# Patient Record
Sex: FEMALE | Race: WHITE | NOT HISPANIC OR LATINO | Employment: OTHER | URBAN - METROPOLITAN AREA
[De-identification: names, ages, dates, MRNs, and addresses within clinical notes are randomized per-mention and may not be internally consistent; named-entity substitution may affect disease eponyms.]

---

## 2022-04-13 ENCOUNTER — LAB REQUISITION (OUTPATIENT)
Dept: LAB | Facility: CLINIC | Age: 72
End: 2022-04-13
Payer: MEDICARE

## 2022-04-13 DIAGNOSIS — M25.551 PAIN IN RIGHT HIP: ICD-10-CM

## 2022-04-13 LAB
GRAM STAIN RESULT: NORMAL
GRAM STAIN RESULT: NORMAL

## 2022-04-13 PROCEDURE — 87075 CULTR BACTERIA EXCEPT BLOOD: CPT | Mod: ORL | Performed by: ORTHOPAEDIC SURGERY

## 2022-04-13 PROCEDURE — 87070 CULTURE OTHR SPECIMN AEROBIC: CPT | Mod: ORL | Performed by: ORTHOPAEDIC SURGERY

## 2022-04-13 PROCEDURE — 87205 SMEAR GRAM STAIN: CPT | Mod: ORL | Performed by: ORTHOPAEDIC SURGERY

## 2022-04-18 LAB — BACTERIA SNV CULT: NO GROWTH

## 2022-04-27 LAB — BACTERIA SNV CULT: NORMAL

## 2024-05-17 ENCOUNTER — TRANSFERRED RECORDS (OUTPATIENT)
Dept: HEALTH INFORMATION MANAGEMENT | Facility: CLINIC | Age: 74
End: 2024-05-17

## 2024-06-18 RX ORDER — FLUTICASONE PROPIONATE 50 MCG
1 SPRAY, SUSPENSION (ML) NASAL DAILY PRN
COMMUNITY

## 2024-06-18 RX ORDER — MELOXICAM 7.5 MG/1
7.5 TABLET ORAL DAILY
Status: ON HOLD | COMMUNITY
End: 2024-06-26

## 2024-06-18 RX ORDER — VITAMIN B COMPLEX
1 TABLET ORAL DAILY
Status: ON HOLD | COMMUNITY
End: 2024-06-25

## 2024-06-18 RX ORDER — MULTIVITAMIN,THERAPEUTIC
1 TABLET ORAL DAILY
COMMUNITY

## 2024-06-18 RX ORDER — TRAMADOL HYDROCHLORIDE 50 MG/1
25-100 TABLET ORAL EVERY 6 HOURS PRN
Status: ON HOLD | COMMUNITY
End: 2024-06-26

## 2024-06-18 RX ORDER — CALCIUM CARBONATE 260MG(650)
1 TABLET,CHEWABLE ORAL DAILY
COMMUNITY
End: 2024-06-18

## 2024-06-18 RX ORDER — AMLODIPINE BESYLATE 2.5 MG/1
2.5 TABLET ORAL DAILY
COMMUNITY

## 2024-06-18 RX ORDER — CETIRIZINE HYDROCHLORIDE 10 MG/1
10 TABLET ORAL DAILY PRN
COMMUNITY

## 2024-06-18 RX ORDER — LORATADINE 10 MG/1
10 TABLET ORAL DAILY
COMMUNITY
End: 2024-06-18

## 2024-06-18 RX ORDER — HYDROCHLOROTHIAZIDE 25 MG/1
25 TABLET ORAL DAILY
COMMUNITY

## 2024-06-18 RX ORDER — AMOXICILLIN 500 MG/1
2000 TABLET, FILM COATED ORAL
COMMUNITY

## 2024-06-18 RX ORDER — TRIAMCINOLONE ACETONIDE 1 MG/G
CREAM TOPICAL 2 TIMES DAILY PRN
COMMUNITY
End: 2024-06-18

## 2024-06-18 NOTE — PROGRESS NOTES
Planning to discharge home on POD 1 in the morning with her granddaughter helping her.       06/18/24 0426   Discharge Planning   Patient/Family Anticipates Transition to home with family   Concerns to be Addressed all concerns addressed in this encounter   Living Arrangements   People in Home grandchild(freddie)   Type of Residence Private Residence   Is your private residence a single family home or apartment? Single family home   Number of Stairs, Within Home, Primary none  (1 exterior step)   Stair Railings, Within Home, Primary none   Once home, are you able to live on one level? Yes   Which level? Main Level   Bathroom Shower/Tub Walk-in shower   Equipment Currently Used at Home raised toilet seat;commode chair;grab bar, toilet;cane, quad;cane, straight;walker, rolling   Support System   Support Systems Family Members  (Granddaughter, Con, will stay with her after surgery)   Do you have someone available to stay with you one or two nights once you are home? Yes   Education   Patient attended total joint pre-op class/received pre-op teaching  email/phone call

## 2024-06-25 ENCOUNTER — HOSPITAL ENCOUNTER (OUTPATIENT)
Facility: CLINIC | Age: 74
Discharge: HOME | End: 2024-06-26
Attending: ORTHOPAEDIC SURGERY | Admitting: ORTHOPAEDIC SURGERY
Payer: MEDICARE

## 2024-06-25 ENCOUNTER — ANESTHESIA EVENT (OUTPATIENT)
Dept: SURGERY | Facility: CLINIC | Age: 74
End: 2024-06-25
Payer: MEDICARE

## 2024-06-25 ENCOUNTER — APPOINTMENT (OUTPATIENT)
Dept: RADIOLOGY | Facility: CLINIC | Age: 74
End: 2024-06-25
Attending: ORTHOPAEDIC SURGERY
Payer: MEDICARE

## 2024-06-25 ENCOUNTER — APPOINTMENT (OUTPATIENT)
Dept: PHYSICAL THERAPY | Facility: CLINIC | Age: 74
End: 2024-06-25
Attending: ORTHOPAEDIC SURGERY
Payer: MEDICARE

## 2024-06-25 ENCOUNTER — ANESTHESIA (OUTPATIENT)
Dept: SURGERY | Facility: CLINIC | Age: 74
End: 2024-06-25
Payer: MEDICARE

## 2024-06-25 ENCOUNTER — DOCUMENTATION ONLY (OUTPATIENT)
Dept: OTHER | Facility: CLINIC | Age: 74
End: 2024-06-25
Payer: COMMERCIAL

## 2024-06-25 DIAGNOSIS — M16.12 PRIMARY OSTEOARTHRITIS OF LEFT HIP: Primary | ICD-10-CM

## 2024-06-25 LAB
CREAT SERPL-MCNC: 0.71 MG/DL (ref 0.51–0.95)
EGFRCR SERPLBLD CKD-EPI 2021: 89 ML/MIN/1.73M2

## 2024-06-25 PROCEDURE — 250N000011 HC RX IP 250 OP 636: Mod: JZ | Performed by: ORTHOPAEDIC SURGERY

## 2024-06-25 PROCEDURE — 250N000011 HC RX IP 250 OP 636: Mod: JZ | Performed by: ANESTHESIOLOGY

## 2024-06-25 PROCEDURE — 360N000077 HC SURGERY LEVEL 4, PER MIN: Performed by: ORTHOPAEDIC SURGERY

## 2024-06-25 PROCEDURE — 250N000009 HC RX 250: Performed by: NURSE ANESTHETIST, CERTIFIED REGISTERED

## 2024-06-25 PROCEDURE — C1776 JOINT DEVICE (IMPLANTABLE): HCPCS | Performed by: ORTHOPAEDIC SURGERY

## 2024-06-25 PROCEDURE — 999N000141 HC STATISTIC PRE-PROCEDURE NURSING ASSESSMENT: Performed by: ORTHOPAEDIC SURGERY

## 2024-06-25 PROCEDURE — 99254 IP/OBS CNSLTJ NEW/EST MOD 60: CPT | Performed by: HOSPITALIST

## 2024-06-25 PROCEDURE — 97530 THERAPEUTIC ACTIVITIES: CPT | Mod: GP

## 2024-06-25 PROCEDURE — 82565 ASSAY OF CREATININE: CPT | Performed by: ORTHOPAEDIC SURGERY

## 2024-06-25 PROCEDURE — 72170 X-RAY EXAM OF PELVIS: CPT

## 2024-06-25 PROCEDURE — 272N000001 HC OR GENERAL SUPPLY STERILE: Performed by: ORTHOPAEDIC SURGERY

## 2024-06-25 PROCEDURE — 250N000011 HC RX IP 250 OP 636: Mod: JZ | Performed by: NURSE ANESTHETIST, CERTIFIED REGISTERED

## 2024-06-25 PROCEDURE — 250N000009 HC RX 250: Performed by: PHYSICIAN ASSISTANT

## 2024-06-25 PROCEDURE — 250N000011 HC RX IP 250 OP 636: Performed by: PHYSICIAN ASSISTANT

## 2024-06-25 PROCEDURE — 258N000001 HC RX 258: Performed by: ORTHOPAEDIC SURGERY

## 2024-06-25 PROCEDURE — 97161 PT EVAL LOW COMPLEX 20 MIN: CPT | Mod: GP

## 2024-06-25 PROCEDURE — 36415 COLL VENOUS BLD VENIPUNCTURE: CPT | Performed by: ORTHOPAEDIC SURGERY

## 2024-06-25 PROCEDURE — 250N000013 HC RX MED GY IP 250 OP 250 PS 637: Performed by: ORTHOPAEDIC SURGERY

## 2024-06-25 PROCEDURE — 250N000013 HC RX MED GY IP 250 OP 250 PS 637: Performed by: ANESTHESIOLOGY

## 2024-06-25 PROCEDURE — C1713 ANCHOR/SCREW BN/BN,TIS/BN: HCPCS | Performed by: ORTHOPAEDIC SURGERY

## 2024-06-25 PROCEDURE — 97110 THERAPEUTIC EXERCISES: CPT | Mod: GP

## 2024-06-25 PROCEDURE — 258N000003 HC RX IP 258 OP 636: Mod: JZ | Performed by: ANESTHESIOLOGY

## 2024-06-25 PROCEDURE — 258N000003 HC RX IP 258 OP 636: Performed by: NURSE ANESTHETIST, CERTIFIED REGISTERED

## 2024-06-25 PROCEDURE — 370N000017 HC ANESTHESIA TECHNICAL FEE, PER MIN: Performed by: ORTHOPAEDIC SURGERY

## 2024-06-25 PROCEDURE — 710N000010 HC RECOVERY PHASE 1, LEVEL 2, PER MIN: Performed by: ORTHOPAEDIC SURGERY

## 2024-06-25 PROCEDURE — 258N000003 HC RX IP 258 OP 636: Performed by: ORTHOPAEDIC SURGERY

## 2024-06-25 PROCEDURE — 250N000013 HC RX MED GY IP 250 OP 250 PS 637: Performed by: PHYSICIAN ASSISTANT

## 2024-06-25 DEVICE — IMP SCR ACET SNN SPHERICAL HEAD 6.5X30MM 71332530: Type: IMPLANTABLE DEVICE | Site: HIP | Status: FUNCTIONAL

## 2024-06-25 DEVICE — IMPLANTABLE DEVICE: Type: IMPLANTABLE DEVICE | Site: HIP | Status: FUNCTIONAL

## 2024-06-25 DEVICE — IMP SHELL SNR ACET R3 3H 56MM 71335556: Type: IMPLANTABLE DEVICE | Site: HIP | Status: FUNCTIONAL

## 2024-06-25 DEVICE — IMP SCR ACET SNN SPHERICAL HEAD 6.5X15MM 71332515: Type: IMPLANTABLE DEVICE | Site: HIP | Status: FUNCTIONAL

## 2024-06-25 DEVICE — GUIDE PIN LONG ACETABULAR: Type: IMPLANTABLE DEVICE | Site: HIP | Status: FUNCTIONAL

## 2024-06-25 DEVICE — IMP SCR ACET SNN SPHERICAL HEAD 6.5X25MM 71332525: Type: IMPLANTABLE DEVICE | Site: HIP | Status: FUNCTIONAL

## 2024-06-25 DEVICE — HEAD OX MODULAR 40MM: Type: IMPLANTABLE DEVICE | Site: HIP | Status: FUNCTIONAL

## 2024-06-25 DEVICE — IMP FEMORAL SLEEVE S&N MED MOD +4MM NECK TI 71344248: Type: IMPLANTABLE DEVICE | Site: HIP | Status: FUNCTIONAL

## 2024-06-25 RX ORDER — KETAMINE HYDROCHLORIDE 10 MG/ML
INJECTION INTRAMUSCULAR; INTRAVENOUS PRN
Status: DISCONTINUED | OUTPATIENT
Start: 2024-06-25 | End: 2024-06-25

## 2024-06-25 RX ORDER — CHOLECALCIFEROL (VITAMIN D3) 50 MCG
1 TABLET ORAL DAILY
COMMUNITY

## 2024-06-25 RX ORDER — CEFAZOLIN SODIUM/WATER 2 G/20 ML
2 SYRINGE (ML) INTRAVENOUS SEE ADMIN INSTRUCTIONS
Status: DISCONTINUED | OUTPATIENT
Start: 2024-06-25 | End: 2024-06-25 | Stop reason: HOSPADM

## 2024-06-25 RX ORDER — PROPOFOL 10 MG/ML
INJECTION, EMULSION INTRAVENOUS CONTINUOUS PRN
Status: DISCONTINUED | OUTPATIENT
Start: 2024-06-25 | End: 2024-06-25

## 2024-06-25 RX ORDER — AMOXICILLIN 250 MG
1 CAPSULE ORAL 2 TIMES DAILY
Status: DISCONTINUED | OUTPATIENT
Start: 2024-06-25 | End: 2024-06-26 | Stop reason: HOSPADM

## 2024-06-25 RX ORDER — OXYCODONE HYDROCHLORIDE 5 MG/1
5 TABLET ORAL EVERY 4 HOURS PRN
Status: DISCONTINUED | OUTPATIENT
Start: 2024-06-25 | End: 2024-06-26 | Stop reason: HOSPADM

## 2024-06-25 RX ORDER — HYDROCHLOROTHIAZIDE 25 MG/1
25 TABLET ORAL DAILY
Status: DISCONTINUED | OUTPATIENT
Start: 2024-06-26 | End: 2024-06-26 | Stop reason: HOSPADM

## 2024-06-25 RX ORDER — HYDROMORPHONE HCL IN WATER/PF 6 MG/30 ML
0.4 PATIENT CONTROLLED ANALGESIA SYRINGE INTRAVENOUS EVERY 5 MIN PRN
Status: DISCONTINUED | OUTPATIENT
Start: 2024-06-25 | End: 2024-06-25 | Stop reason: HOSPADM

## 2024-06-25 RX ORDER — ONDANSETRON 2 MG/ML
4 INJECTION INTRAMUSCULAR; INTRAVENOUS EVERY 6 HOURS PRN
Status: DISCONTINUED | OUTPATIENT
Start: 2024-06-25 | End: 2024-06-26 | Stop reason: HOSPADM

## 2024-06-25 RX ORDER — HYDROMORPHONE HCL IN WATER/PF 6 MG/30 ML
0.2 PATIENT CONTROLLED ANALGESIA SYRINGE INTRAVENOUS EVERY 5 MIN PRN
Status: DISCONTINUED | OUTPATIENT
Start: 2024-06-25 | End: 2024-06-25 | Stop reason: HOSPADM

## 2024-06-25 RX ORDER — TRANEXAMIC ACID 650 MG/1
1950 TABLET ORAL ONCE
Status: COMPLETED | OUTPATIENT
Start: 2024-06-25 | End: 2024-06-25

## 2024-06-25 RX ORDER — ASCORBIC ACID 125 MG
125 TABLET,CHEWABLE ORAL DAILY
COMMUNITY

## 2024-06-25 RX ORDER — OXYCODONE HYDROCHLORIDE 5 MG/1
10 TABLET ORAL EVERY 4 HOURS PRN
Status: DISCONTINUED | OUTPATIENT
Start: 2024-06-25 | End: 2024-06-26 | Stop reason: HOSPADM

## 2024-06-25 RX ORDER — CEFAZOLIN SODIUM/WATER 2 G/20 ML
2 SYRINGE (ML) INTRAVENOUS
Status: COMPLETED | OUTPATIENT
Start: 2024-06-25 | End: 2024-06-25

## 2024-06-25 RX ORDER — SODIUM CHLORIDE, SODIUM LACTATE, POTASSIUM CHLORIDE, CALCIUM CHLORIDE 600; 310; 30; 20 MG/100ML; MG/100ML; MG/100ML; MG/100ML
INJECTION, SOLUTION INTRAVENOUS CONTINUOUS
Status: DISCONTINUED | OUTPATIENT
Start: 2024-06-25 | End: 2024-06-25 | Stop reason: HOSPADM

## 2024-06-25 RX ORDER — NALOXONE HYDROCHLORIDE 0.4 MG/ML
0.1 INJECTION, SOLUTION INTRAMUSCULAR; INTRAVENOUS; SUBCUTANEOUS
Status: DISCONTINUED | OUTPATIENT
Start: 2024-06-25 | End: 2024-06-25 | Stop reason: HOSPADM

## 2024-06-25 RX ORDER — HYDROXYZINE HYDROCHLORIDE 10 MG/1
10 TABLET, FILM COATED ORAL EVERY 6 HOURS PRN
Status: DISCONTINUED | OUTPATIENT
Start: 2024-06-25 | End: 2024-06-26 | Stop reason: HOSPADM

## 2024-06-25 RX ORDER — AMLODIPINE BESYLATE 2.5 MG/1
2.5 TABLET ORAL DAILY
Status: DISCONTINUED | OUTPATIENT
Start: 2024-06-26 | End: 2024-06-26 | Stop reason: HOSPADM

## 2024-06-25 RX ORDER — HYDROMORPHONE HCL IN WATER/PF 6 MG/30 ML
0.4 PATIENT CONTROLLED ANALGESIA SYRINGE INTRAVENOUS
Status: DISCONTINUED | OUTPATIENT
Start: 2024-06-25 | End: 2024-06-26 | Stop reason: HOSPADM

## 2024-06-25 RX ORDER — BUPIVACAINE HYDROCHLORIDE 5 MG/ML
INJECTION, SOLUTION EPIDURAL; INTRACAUDAL
Status: COMPLETED | OUTPATIENT
Start: 2024-06-25 | End: 2024-06-25

## 2024-06-25 RX ORDER — CEFAZOLIN SODIUM 1 G/3ML
1 INJECTION, POWDER, FOR SOLUTION INTRAMUSCULAR; INTRAVENOUS EVERY 8 HOURS
Status: COMPLETED | OUTPATIENT
Start: 2024-06-25 | End: 2024-06-26

## 2024-06-25 RX ORDER — ONDANSETRON 4 MG/1
4 TABLET, ORALLY DISINTEGRATING ORAL EVERY 6 HOURS PRN
Status: DISCONTINUED | OUTPATIENT
Start: 2024-06-25 | End: 2024-06-26 | Stop reason: HOSPADM

## 2024-06-25 RX ORDER — KETOROLAC TROMETHAMINE 30 MG/ML
15 INJECTION, SOLUTION INTRAMUSCULAR; INTRAVENOUS EVERY 6 HOURS
Status: COMPLETED | OUTPATIENT
Start: 2024-06-25 | End: 2024-06-26

## 2024-06-25 RX ORDER — DEXAMETHASONE SODIUM PHOSPHATE 10 MG/ML
INJECTION, SOLUTION INTRAMUSCULAR; INTRAVENOUS PRN
Status: DISCONTINUED | OUTPATIENT
Start: 2024-06-25 | End: 2024-06-25

## 2024-06-25 RX ORDER — ACETAMINOPHEN 325 MG/1
975 TABLET ORAL EVERY 8 HOURS
Status: DISCONTINUED | OUTPATIENT
Start: 2024-06-25 | End: 2024-06-26 | Stop reason: HOSPADM

## 2024-06-25 RX ORDER — ASCORBIC ACID 125 MG
125 TABLET,CHEWABLE ORAL DAILY
Status: DISCONTINUED | OUTPATIENT
Start: 2024-06-25 | End: 2024-06-25 | Stop reason: DRUGHIGH

## 2024-06-25 RX ORDER — ONDANSETRON 4 MG/1
4 TABLET, ORALLY DISINTEGRATING ORAL EVERY 30 MIN PRN
Status: DISCONTINUED | OUTPATIENT
Start: 2024-06-25 | End: 2024-06-25 | Stop reason: HOSPADM

## 2024-06-25 RX ORDER — ACETAMINOPHEN 325 MG/1
975 TABLET ORAL ONCE
Status: COMPLETED | OUTPATIENT
Start: 2024-06-25 | End: 2024-06-25

## 2024-06-25 RX ORDER — PROCHLORPERAZINE MALEATE 5 MG
5 TABLET ORAL EVERY 6 HOURS PRN
Status: DISCONTINUED | OUTPATIENT
Start: 2024-06-25 | End: 2024-06-26 | Stop reason: HOSPADM

## 2024-06-25 RX ORDER — ASPIRIN 81 MG/1
81 TABLET ORAL 2 TIMES DAILY
Status: DISCONTINUED | OUTPATIENT
Start: 2024-06-25 | End: 2024-06-26 | Stop reason: HOSPADM

## 2024-06-25 RX ORDER — FENTANYL CITRATE 50 UG/ML
50 INJECTION, SOLUTION INTRAMUSCULAR; INTRAVENOUS EVERY 5 MIN PRN
Status: DISCONTINUED | OUTPATIENT
Start: 2024-06-25 | End: 2024-06-25 | Stop reason: HOSPADM

## 2024-06-25 RX ORDER — GLYCOPYRROLATE 0.2 MG/ML
INJECTION, SOLUTION INTRAMUSCULAR; INTRAVENOUS PRN
Status: DISCONTINUED | OUTPATIENT
Start: 2024-06-25 | End: 2024-06-25

## 2024-06-25 RX ORDER — ACETAMINOPHEN 325 MG/1
650 TABLET ORAL EVERY 4 HOURS PRN
Status: DISCONTINUED | OUTPATIENT
Start: 2024-06-28 | End: 2024-06-26 | Stop reason: HOSPADM

## 2024-06-25 RX ORDER — CALCIUM CARBONATE 500 MG/1
500 TABLET, CHEWABLE ORAL 4 TIMES DAILY PRN
Status: DISCONTINUED | OUTPATIENT
Start: 2024-06-25 | End: 2024-06-26 | Stop reason: HOSPADM

## 2024-06-25 RX ORDER — ONDANSETRON 2 MG/ML
INJECTION INTRAMUSCULAR; INTRAVENOUS PRN
Status: DISCONTINUED | OUTPATIENT
Start: 2024-06-25 | End: 2024-06-25

## 2024-06-25 RX ORDER — ONDANSETRON 2 MG/ML
4 INJECTION INTRAMUSCULAR; INTRAVENOUS EVERY 30 MIN PRN
Status: DISCONTINUED | OUTPATIENT
Start: 2024-06-25 | End: 2024-06-25 | Stop reason: HOSPADM

## 2024-06-25 RX ORDER — LIDOCAINE 40 MG/G
CREAM TOPICAL
Status: DISCONTINUED | OUTPATIENT
Start: 2024-06-25 | End: 2024-06-26 | Stop reason: HOSPADM

## 2024-06-25 RX ORDER — SODIUM CHLORIDE, SODIUM LACTATE, POTASSIUM CHLORIDE, CALCIUM CHLORIDE 600; 310; 30; 20 MG/100ML; MG/100ML; MG/100ML; MG/100ML
INJECTION, SOLUTION INTRAVENOUS CONTINUOUS
Status: DISCONTINUED | OUTPATIENT
Start: 2024-06-25 | End: 2024-06-26 | Stop reason: HOSPADM

## 2024-06-25 RX ORDER — CALCIUM CARBONATE/VITAMIN D3 600 MG-10
1 TABLET ORAL 2 TIMES DAILY
COMMUNITY

## 2024-06-25 RX ORDER — FENTANYL CITRATE 50 UG/ML
25 INJECTION, SOLUTION INTRAMUSCULAR; INTRAVENOUS EVERY 5 MIN PRN
Status: DISCONTINUED | OUTPATIENT
Start: 2024-06-25 | End: 2024-06-25 | Stop reason: HOSPADM

## 2024-06-25 RX ORDER — FENTANYL CITRATE 50 UG/ML
INJECTION, SOLUTION INTRAMUSCULAR; INTRAVENOUS PRN
Status: DISCONTINUED | OUTPATIENT
Start: 2024-06-25 | End: 2024-06-25

## 2024-06-25 RX ORDER — MAGNESIUM SULFATE 4 G/50ML
4 INJECTION INTRAVENOUS ONCE
Status: COMPLETED | OUTPATIENT
Start: 2024-06-25 | End: 2024-06-25

## 2024-06-25 RX ORDER — HYDROMORPHONE HCL IN WATER/PF 6 MG/30 ML
0.2 PATIENT CONTROLLED ANALGESIA SYRINGE INTRAVENOUS
Status: DISCONTINUED | OUTPATIENT
Start: 2024-06-25 | End: 2024-06-26 | Stop reason: HOSPADM

## 2024-06-25 RX ORDER — BISACODYL 10 MG
10 SUPPOSITORY, RECTAL RECTAL DAILY PRN
Status: DISCONTINUED | OUTPATIENT
Start: 2024-06-25 | End: 2024-06-26 | Stop reason: HOSPADM

## 2024-06-25 RX ORDER — POLYETHYLENE GLYCOL 3350 17 G/17G
17 POWDER, FOR SOLUTION ORAL DAILY
Status: DISCONTINUED | OUTPATIENT
Start: 2024-06-26 | End: 2024-06-26 | Stop reason: HOSPADM

## 2024-06-25 RX ADMIN — KETAMINE HYDROCHLORIDE 15 MG: 10 INJECTION INTRAMUSCULAR; INTRAVENOUS at 09:35

## 2024-06-25 RX ADMIN — SENNOSIDES AND DOCUSATE SODIUM 1 TABLET: 50; 8.6 TABLET ORAL at 20:22

## 2024-06-25 RX ADMIN — SODIUM CHLORIDE, POTASSIUM CHLORIDE, SODIUM LACTATE AND CALCIUM CHLORIDE: 600; 310; 30; 20 INJECTION, SOLUTION INTRAVENOUS at 23:48

## 2024-06-25 RX ADMIN — ONDANSETRON 4 MG: 2 INJECTION INTRAMUSCULAR; INTRAVENOUS at 09:40

## 2024-06-25 RX ADMIN — MIDAZOLAM 2 MG: 1 INJECTION INTRAMUSCULAR; INTRAVENOUS at 09:24

## 2024-06-25 RX ADMIN — Medication 2 G: at 09:20

## 2024-06-25 RX ADMIN — ACETAMINOPHEN 975 MG: 325 TABLET ORAL at 23:00

## 2024-06-25 RX ADMIN — PHENYLEPHRINE HYDROCHLORIDE 0.2 MCG/KG/MIN: 10 INJECTION INTRAVENOUS at 09:40

## 2024-06-25 RX ADMIN — TRANEXAMIC ACID 1950 MG: 650 TABLET ORAL at 07:50

## 2024-06-25 RX ADMIN — PHENYLEPHRINE HYDROCHLORIDE 50 MCG: 10 INJECTION INTRAVENOUS at 10:57

## 2024-06-25 RX ADMIN — GLYCOPYRROLATE 0.2 MG: 0.2 INJECTION INTRAMUSCULAR; INTRAVENOUS at 09:45

## 2024-06-25 RX ADMIN — ACETAMINOPHEN 975 MG: 325 TABLET ORAL at 14:49

## 2024-06-25 RX ADMIN — Medication 200 MG: at 16:44

## 2024-06-25 RX ADMIN — MAGNESIUM SULFATE HEPTAHYDRATE 4 G: 4 INJECTION, SOLUTION INTRAVENOUS at 08:35

## 2024-06-25 RX ADMIN — ASPIRIN 81 MG: 81 TABLET, COATED ORAL at 20:22

## 2024-06-25 RX ADMIN — CEFAZOLIN 1 G: 1 INJECTION, POWDER, FOR SOLUTION INTRAMUSCULAR; INTRAVENOUS at 16:45

## 2024-06-25 RX ADMIN — DEXAMETHASONE SODIUM PHOSPHATE 4 MG: 10 INJECTION, SOLUTION INTRAMUSCULAR; INTRAVENOUS at 09:40

## 2024-06-25 RX ADMIN — FENTANYL CITRATE 50 MCG: 50 INJECTION INTRAMUSCULAR; INTRAVENOUS at 09:30

## 2024-06-25 RX ADMIN — PROPOFOL 100 MCG/KG/MIN: 10 INJECTION, EMULSION INTRAVENOUS at 09:32

## 2024-06-25 RX ADMIN — KETOROLAC TROMETHAMINE 15 MG: 30 INJECTION, SOLUTION INTRAMUSCULAR at 23:01

## 2024-06-25 RX ADMIN — BUPIVACAINE HYDROCHLORIDE 2.6 ML: 5 INJECTION, SOLUTION EPIDURAL; INTRACAUDAL; PERINEURAL at 09:30

## 2024-06-25 RX ADMIN — SODIUM CHLORIDE, POTASSIUM CHLORIDE, SODIUM LACTATE AND CALCIUM CHLORIDE: 600; 310; 30; 20 INJECTION, SOLUTION INTRAVENOUS at 08:35

## 2024-06-25 RX ADMIN — FENTANYL CITRATE 50 MCG: 50 INJECTION INTRAMUSCULAR; INTRAVENOUS at 09:28

## 2024-06-25 RX ADMIN — ACETAMINOPHEN 975 MG: 325 TABLET ORAL at 08:17

## 2024-06-25 RX ADMIN — OXYCODONE HYDROCHLORIDE 5 MG: 5 TABLET ORAL at 22:02

## 2024-06-25 RX ADMIN — PHENYLEPHRINE HYDROCHLORIDE 50 MCG: 10 INJECTION INTRAVENOUS at 09:48

## 2024-06-25 RX ADMIN — KETOROLAC TROMETHAMINE 15 MG: 30 INJECTION, SOLUTION INTRAMUSCULAR at 16:45

## 2024-06-25 RX ADMIN — PHENYLEPHRINE HYDROCHLORIDE 50 MCG: 10 INJECTION INTRAVENOUS at 10:37

## 2024-06-25 RX ADMIN — SODIUM CHLORIDE, POTASSIUM CHLORIDE, SODIUM LACTATE AND CALCIUM CHLORIDE: 600; 310; 30; 20 INJECTION, SOLUTION INTRAVENOUS at 10:00

## 2024-06-25 ASSESSMENT — ACTIVITIES OF DAILY LIVING (ADL)
ADLS_ACUITY_SCORE: 29
ADLS_ACUITY_SCORE: 22
ADLS_ACUITY_SCORE: 23
ADLS_ACUITY_SCORE: 27
ADLS_ACUITY_SCORE: 22
ADLS_ACUITY_SCORE: 25
ADLS_ACUITY_SCORE: 29
ADLS_ACUITY_SCORE: 22
ADLS_ACUITY_SCORE: 23
ADLS_ACUITY_SCORE: 25
ADLS_ACUITY_SCORE: 22
ADLS_ACUITY_SCORE: 22
ADLS_ACUITY_SCORE: 29
ADLS_ACUITY_SCORE: 22

## 2024-06-25 NOTE — ANESTHESIA CARE TRANSFER NOTE
Patient: Maile Nazario    Procedure: Procedure(s):  LEFT TOTAL HIP ARTHROPLASTY       Diagnosis: Osteoarthritis of left hip [M16.12]  Diagnosis Additional Information: No value filed.    Anesthesia Type:   Spinal     Note:    Oropharynx: oropharynx clear of all foreign objects  Level of Consciousness: awake  Oxygen Supplementation: face mask  Level of Supplemental Oxygen (L/min / FiO2): 6  Independent Airway: airway patency satisfactory and stable  Dentition: dentition unchanged  Vital Signs Stable: post-procedure vital signs reviewed and stable  Report to RN Given: handoff report given  Patient transferred to: PACU    Handoff Report: Identifed the Patient, Identified the Reponsible Provider, Reviewed the pertinent medical history, Discussed the surgical course, Reviewed Intra-OP anesthesia mangement and issues during anesthesia, Set expectations for post-procedure period and Allowed opportunity for questions and acknowledgement of understanding    Vitals:  Vitals Value Taken Time   /55 06/25/24 1120   Temp 36.6  C (97.9  F) 06/25/24 1120   Pulse 75 06/25/24 1121   Resp 12 06/25/24 1121   SpO2 100 % 06/25/24 1121   Vitals shown include unfiled device data.    Electronically Signed By: AIYANA Cao CRNA  June 25, 2024  11:23 AM

## 2024-06-25 NOTE — ANESTHESIA POSTPROCEDURE EVALUATION
Patient: Maile Nazario    Procedure: Procedure(s):  LEFT TOTAL HIP ARTHROPLASTY       Anesthesia Type:  Spinal    Note:  Disposition: Inpatient   Postop Pain Control: Uneventful            Sign Out: Well controlled pain   PONV: No   Neuro/Psych: Uneventful            Sign Out: Acceptable/Baseline neuro status   Airway/Respiratory: Uneventful            Sign Out: Acceptable/Baseline resp. status   CV/Hemodynamics: Uneventful            Sign Out: Acceptable CV status; No obvious hypovolemia; No obvious fluid overload   Other NRE: NONE   DID A NON-ROUTINE EVENT OCCUR? No       Last vitals:  Vitals Value Taken Time   /62 06/25/24 1230   Temp 36.6  C (97.9  F) 06/25/24 1120   Pulse 73 06/25/24 1241   Resp 16 06/25/24 1230   SpO2 97 % 06/25/24 1241   Vitals shown include unfiled device data.    Electronically Signed By: Reji Carreon MD  June 25, 2024  12:43 PM

## 2024-06-25 NOTE — PROGRESS NOTES
06/25/24 1500   Appointment Info   Signing Clinician's Name / Credentials (PT) Azael Vazquez, PT, DPT   Quick Adds   Quick Adds Certification   Living Environment   People in Home grandchild(freddie)  (20 y.o.)   Current Living Arrangements house   Home Accessibility stairs to enter home   Number of Stairs, Main Entrance 1   Self-Care   Usual Activity Tolerance good   Current Activity Tolerance moderate   Equipment Currently Used at Home cane, straight;walker, rolling   Fall history within last six months no   Activity/Exercise/Self-Care Comment Hx of frequent walking   General Information   Onset of Illness/Injury or Date of Surgery 06/25/24   Referring Physician Dr. Ledezma   Patient/Family Therapy Goals Statement (PT) Decrease pain with mobility   Pertinent History of Current Problem (include personal factors and/or comorbidities that impact the POC) s/p L TIAN   Existing Precautions/Restrictions no hip IR;no hip ADD past midline;weight bearing   Weight-Bearing Status - LLE weight-bearing as tolerated   Weight-Bearing Status - RLE full weight-bearing   Range of Motion (ROM)   ROM Comment WFL, decreased LLE s/p TIAN   Strength (Manual Muscle Testing)   Strength Comments WFL   Bed Mobility   Bed Mobility supine-sit   Supine-Sit [place] (Bed Mobility) modified independence;verbal cues   Impairments Contributing to Impaired Bed Mobility pain   Assistive Device (Bed Mobility) bed rails   Transfers   Transfers sit-stand transfer   Sit-Stand Transfer   Sit-Stand [place] (Transfers) contact guard;verbal cues   Assistive Device (Sit-Stand Transfers) walker, front-wheeled   Gait/Stairs (Locomotion)   [place] Level (Gait) contact guard   Assistive Device (Gait) walker, front-wheeled   Distance in Feet (Gait) 10'   Pattern (Gait) step-through   Deviations/Abnormal Patterns (Gait) rené decreased;stride length decreased   Clinical Impression   Criteria for Skilled Therapeutic Intervention Yes, treatment indicated    PT Diagnosis (PT) impaired functional mobility   Influenced by the following impairments s/p L TIAN   Functional limitations due to impairments pain, impaired balance, decreased ROM/strength   Clinical Presentation (PT Evaluation Complexity) stable   Clinical Presentation Rationale Pt presents as medically diagnosed   Clinical Decision Making (Complexity) low complexity   Planned Therapy Interventions (PT) gait training;home exercise program;patient/family education;ROM (range of motion);strengthening;transfer training   Risk & Benefits of therapy have been explained care plan/treatment goals reviewed;patient   PT Total Evaluation Time   PT Eval, Low Complexity Minutes (04808) 10   Therapy Certification   Start of care date 06/25/24   Certification date from 06/25/24   Certification date to 07/25/24   Medical Diagnosis S/p L TIAN   Physical Therapy Goals   PT Frequency Daily   PT Predicted Duration/Target Date for Goal Attainment 06/27/24   PT Goals Transfers;Gait;PT Goal 1   PT: Transfers Supervision/stand-by assist;Sit to/from stand   PT: Gait Supervision/stand-by assist;Rolling walker;100 feet   PT: Goal 1 Independen with TIAN HEP for LE strength/ROM   Interventions   Interventions Quick Adds Gait Training;Therapeutic Activity;Therapeutic Procedure   Therapeutic Procedure/Exercise   Ther. Procedure: strength, endurance, ROM, flexibillity Minutes (46505) 10   Symptoms Noted During/After Treatment increased pain;fatigue   Treatment Detail/Skilled Intervention Completed ITAN HEP w/ cues for proper carry-out/tecnique.   Therapeutic Activity   Therapeutic Activities: dynamic activities to improve functional performance Minutes (20775) 10   Symptoms Noted During/After Treatment Increased pain   Treatment Detail/Skilled Intervention Supine to sit, Mod I with head of bed elevated and use of bed rail. SBA at EOB, increased time for set up, d/t Pt first time up. Sit <> stand x2 w/ SBA, independent with toileting and washing  hands at sink. Education on icing, pain control, POC.   Gait Training   Gait Training Minutes (19063) 5   Symptoms Noted During/After Treatment (Gait Training) fatigue;increased pain   Treatment Detail/Skilled Intervention Pt ambulating to and from bathroom, CGA with FWW, no loss of balance or adverse s/s. Patient weight-shifting, marching in place prior to amb. Educated on walking program and mobilizing with nursing as able.   Distance in Feet 10' x 2   [place] Level (Gait Training) contact guard   Physical Assistance Level (Gait Training) supervision;verbal cues   Weight Bearing (Gait Training) weight-bearing as tolerated   Assistive Device (Gait Training) rolling walker   Pattern Analysis (Gait Training) swing-through gait   Gait Analysis Deviations decreased rené;decreased step length;decreased weight-shifting ability   Impairments (Gait Analysis/Training) pain;strength decreased   PT Discharge Planning   PT Plan increase amb and review HEP   PT Discharge Recommendation (DC Rec) other (see comments)  (Defer to ortho)   PT Rationale for DC Rec Grandaught at home to support, mobilizing well, pain controlled, no concerns with discharge   PT Brief overview of current status SBA w/ transfers, amb in room with FWW   [facility]  OUTPATIENT PHYSICAL THERAPY EVALUATION  PLAN OF TREATMENT FOR OUTPATIENT REHABILITATION  (COMPLETE FOR INITIAL CLAIMS ONLY)  Patient's Last Name, First Name, M.I.  YOB: 1950  Maile Nazario                        Provider's Name  [facility] Medical Record No.  [id number]                             Onset Date:  06/25/24   Start of Care Date:  (P) 06/25/24   Type:     _X_PT   ___OT   ___SLP Medical Diagnosis:  (P) S/p L TIAN              PT Diagnosis:  (P) impaired functional mobility Visits from SOC:  1     See note for plan of treatment, functional goals and certification details    I CERTIFY THE NEED  FOR THESE SERVICES FURNISHED UNDER        THIS PLAN OF TREATMENT AND WHILE UNDER MY CARE     (Physician co-signature of this document indicates review and certification of the therapy plan).

## 2024-06-25 NOTE — CONSULTS
"MEDICINE CONSULT    Physician requesting consult: Dr. Ledezma  Reason for consult: Hospitalist consult     Assessment and Plan:    Maile Nazario is a 74 year old old female who  has a past medical history of Arthritis, Cerebellopontine angle meningioma (H), Chronic rhinitis, Hyperlipidemia, Hypertension, Lumbar spinal stenosis, and Osteopenia.     Hypertension:  No LVH on EKG. Hydrochlorothiazide and amlodipine resumed     Hyperlipidemia: Has declined statins outpt despite elevated Ca++ score.    History of cerebellopontine angle meningioma: Followed at [facility], anticipated surgery in Oct. Last imaged in  with interval enlargement    Left hip primary osteoarthritis    Body mass index is 21.05 kg/m .  Status-post Procedure(s):  LEFT TOTAL HIP ARTHROPLASTY performed on 24  Complications: none  EBL: 150cc  No results found for: \"HGB\", \"CR\"  Pre-op Cr 0.7  Pre-op Hb 13.7  PPx: defer to surgery, will comment if medicine input requested     History:   Maile Nazario is a 74 year old old female who is feeling pretty well postoperatively when evaluated in the PACU.  Afebrile, VS stable.  No focal complaints.  Still has some numbness of the left lower extremity.  Denies chest pain, shortness of breath, nausea (has taken a few bites and sips) or new vision loss.    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     Past Surgical History:   Procedure Laterality Date    ARTHROPLASTY REVISION HIP Right 2016    ARTHROSCOPY SHOULDER Right 2014    BUNIONECTOMY Bilateral      SECTION       and     SHOULDER " SURGERY Right 1979    tendon surgery    TOTAL HIP ARTHROPLASTY Right 03/2013    TOTAL SHOULDER ARTHROPLASTY Right 07/2013     Allergies  Allergies   Allergen Reactions    Gabapentin Other (See Comments)     Hoarseness and dry mouth    Lisinopril Cough    Prednisone Headache    Propylene Glycol Rash     Social History  Reviewed, and she  reports that she has never smoked. She has never used smokeless tobacco. She reports that she does not currently use alcohol. She reports that she does not use drugs.  Social History     Tobacco Use    Smoking status: Never    Smokeless tobacco: Never   Substance Use Topics    Alcohol use: Not Currently     Comment: 2 a month     Family History  Reviewed, and family history is not on file.    Review of Systems  All pertinent positives and negatives reviewed in History.  All other 12 point review of systems reviewed and negative.      Objective:    Physical Exam  Appears somewhat sleepy but awakens easily  Answers questions appropriately, oriented x 3  Somewhat dry mucous membranes  Pupils symmetric  Regular rate and rhythm without murmur  Lungs clear to auscultation bilaterally  Abdomen nonacute  Decreased sensation left lower extremity  Motor bilateral lower extremities intact on gross examination  Postoperative left hip  No peripheral edema  Cardiographics  ECG: NSR, rotation of Z-axis  Imaging  No image results found.     Lab Review   No visits with results within 2 Month(s) from this visit.   Latest known visit with results is:   Lab Requisition on 04/13/2022   Component Date Value    Culture 04/13/2022 No anaerobic organisms isolated     Culture 04/13/2022 No Growth     Gram Stain Result 04/13/2022 No organisms seen     Gram Stain Result 04/13/2022 2+ WBC seen      Appreciate the opportunity to participate in the care of Maile Nazario, please feel free to contact for any questions or concerns     Azael Wasserman MD, MPH  Hospitalist  [facility]  Office #  [phone]

## 2024-06-25 NOTE — ANESTHESIA PREPROCEDURE EVALUATION
Anesthesia Pre-Procedure Evaluation    Patient: Maile Nazario   MRN: [id number] : 1950        Procedure : Procedure(s):  LEFT TOTAL HIP ARTHROPLASTY          Past Medical History:   Diagnosis Date     Arthritis      Cerebellopontine angle meningioma (H)      Chronic rhinitis      Hyperlipidemia      Hypertension      Lumbar spinal stenosis      Osteopenia       Past Surgical History:   Procedure Laterality Date     ARTHROPLASTY REVISION HIP Right 2016     ARTHROSCOPY SHOULDER Right 2014     BUNIONECTOMY Bilateral       SECTION       and      SHOULDER SURGERY Right     tendon surgery     TOTAL HIP ARTHROPLASTY Right 2013     TOTAL SHOULDER ARTHROPLASTY Right 2013      Allergies   Allergen Reactions     Gabapentin Other (See Comments)     Hoarseness and dry mouth     Lisinopril Cough     Prednisone Headache     Propylene Glycol Rash      Social History     Tobacco Use     Smoking status: Never     Smokeless tobacco: Never   Substance Use Topics     Alcohol use: Not Currently     Comment: 2 a month      Wt Readings from Last 1 Encounters:   24 52.6 kg (116 lb)        Anesthesia Evaluation   Pt has had prior anesthetic. Type: Regional.    No history of anesthetic complications       ROS/MED HX  ENT/Pulmonary:  - neg pulmonary ROS     Neurologic:       Cardiovascular:     (+) Dyslipidemia hypertension- -   -  - -                                      METS/Exercise Tolerance:     Hematologic:       Musculoskeletal:       GI/Hepatic:    (-) GERD   Renal/Genitourinary:  - neg Renal ROS     Endo:  - neg endo ROS  (-) obesity   Psychiatric/Substance Use:  - neg psychiatric ROS     Infectious Disease:  - neg infectious disease ROS     Malignancy:       Other: Comment: CPA meningioma, surgery scheduled for October - neg other ROS        Physical Exam    Airway  airway exam normal      Mallampati: II   TM distance: > 3 FB   Neck ROM: full   Mouth opening: > 3 cm    Respiratory  "Devices and Support         Dental       (+) Minor Abnormalities - some fillings, tiny chips      Cardiovascular   cardiovascular exam normal       Rhythm and rate: regular and normal     Pulmonary   pulmonary exam normal        breath sounds clear to auscultation       OUTSIDE LABS:  CBC: No results found for: \"WBC\", \"HGB\", \"HCT\", \"PLT\"  BMP: No results found for: \"NA\", \"POTASSIUM\", \"CHLORIDE\", \"CO2\", \"BUN\", \"CR\", \"GLC\"  COAGS: No results found for: \"PTT\", \"INR\", \"FIBR\"  POC: No results found for: \"BGM\", \"HCG\", \"HCGS\"  HEPATIC: No results found for: \"ALBUMIN\", \"PROTTOTAL\", \"ALT\", \"AST\", \"GGT\", \"ALKPHOS\", \"BILITOTAL\", \"BILIDIRECT\", \"LAN\"  OTHER: No results found for: \"PH\", \"LACT\", \"A1C\", \"ANITA\", \"PHOS\", \"MAG\", \"LIPASE\", \"AMYLASE\", \"TSH\", \"T4\", \"T3\", \"CRP\", \"SED\"    Anesthesia Plan    ASA Status:  2    NPO Status:  NPO Appropriate    Anesthesia Type: Spinal.         Techniques and Equipment:       - Drips/Meds: Ketamine     Consents    Anesthesia Plan(s) and associated risks, benefits, and realistic alternatives discussed. Questions answered and patient/representative(s) expressed understanding.     - Discussed: Risks, Benefits and Alternatives for the PROCEDURE were discussed     - Discussed with:  Patient            Postoperative Care    Pain management: IV analgesics, Oral pain medications, Multi-modal analgesia.   PONV prophylaxis: Ondansetron (or other 5HT-3)     Comments:    Other Comments: Magnesium         Reji Carreon MD    I have reviewed the pertinent notes and labs in the chart from the past 30 days and (re)examined the patient.  Any updates or changes from those notes are reflected in this note.                  "

## 2024-06-25 NOTE — PHARMACY-ADMISSION MEDICATION HISTORY
Pharmacist Admission Medication History    Admission medication history is complete. The information provided in this note is only as accurate as the sources available at the time of the update.    Information Source(s): Patient and CareEverywhere/SureScripts via in-person    Pertinent Information:      Changes made to PTA medication list:  Added: None  Deleted: None  Changed: None    Allergies reviewed with patient and updates made in EHR: yes    Medication History Completed By: [facility] 6/25/2024 8:27 AM    PTA Med List   Medication Sig Last Dose    amLODIPine (NORVASC) 2.5 MG tablet Take 2.5 mg by mouth daily 6/25/2024 at am    amoxicillin (AMOXIL) 500 MG tablet Take 2,000 mg by mouth once as needed (Prior to dental visit) prn at prn    calcium carbonate-vitamin D (CALTRATE) 600-10 MG-MCG per tablet Take 1 tablet by mouth 2 times daily 6/19/2024 at pm    cetirizine (ZYRTEC) 10 MG tablet Take 10 mg by mouth daily as needed 6/25/2024 at am    fluticasone (FLONASE) 50 MCG/ACT nasal spray Spray 1 spray into both nostrils daily as needed 6/25/2024 at am - has with    hydrochlorothiazide (HYDRODIURIL) 25 MG tablet Take 25 mg by mouth daily 6/24/2024 at am    Magnesium Citrate 125 MG CAPS Take 125 mg by mouth daily 6/19/2024 at am    meloxicam (MOBIC) 7.5 MG tablet Take 7.5 mg by mouth daily 6/16/2024 at am    multivitamin, therapeutic (THERA-VIT) TABS tablet Take 1 tablet by mouth daily 6/19/2024 at am    traMADol (ULTRAM) 50 MG tablet Take  mg by mouth every 6 hours as needed for pain prn at prn    vitamin D3 (CHOLECALCIFEROL) 50 mcg (2000 units) tablet Take 1 tablet by mouth daily 6/19/2024 at am

## 2024-06-25 NOTE — OP NOTE
"Operative Note    PROCEDURE:  LEFT MINIMALLY INVASIVE TOTAL HIP ARTHROPLASTY    Pre-Procedure Diagnosis:  Left Hip Primary OA (osteoarthritis) of hip     Post-Procedure Diagnosis:    Left Hip Primary OA(osteoarthritis) of hip    Surgeon(s):  Hema Ledezma MD    Assist:  Mark Fuentes PA-C  A PAC was necessary to ensure safety of this patientand adequate progression of the procedure.    Anesthesia Type:  Spinal    Complications:  None    Condition on discharge from OR:  Stable    Estimated Blood Loss:   150 cc    Specimens:    None    Implants Used:  Smith and Nephew R3 cup (size 56 mm), 40 mm ID/ 56 OD +4mm, 20 degree XLPE liner, Polar stem (size 3 Lateral), 40 mm femoral head (+4 mm length)       Drains:   None    Description:  Patient brought to the operating room and after adequate anesthesia was induced, the left lower extremity wasprepped and draped in the usual sterile fashion while the patient was positioned in the lateral decubitus position.  I went ahead with an MIS incision and proceeded through the skin and subQ and identified the gluteusmaximus fascia.  I opened the fascia in line with the fibers and placed an east, west retractor.  We internally rotated the hip and took down the external rotators.  At this point I performed a \"T\" capsulotomy and saved that capsule for later repair.  I used the Smith and Nephew length, offset device to establish a baseline leg length and then dislocated the hip. Per pre-operative templating, I made a neck cut 18 mm above the lesser trochanter and removed the femoral head which had grade 4 chondromalacia.  The proximal femur was carefully retracted and the acetabular labrum and fovea were excised.  I medialized with a 45 mm reamer to the medial wall and circumferentially reamed up to a 55 mm reamer and then impacted a 56 mm acetabular component.  The central hole cover placed and then I locked the polyethylene liner.  Next, we turned attention to the femur.  JUVENAL robles" cutterand canal finder were used to open the proximal femur.  I lateralized with a lateralizing reamer and then we used sequential broaches up to a 3 broach and it had good fit,fill, and stability.  We trialed both high and standard offset necks, and settled on the lateral offset neck due to best soft tissue tensioning.  With the +4 mm X 40 mm trial ball in place, we ran the hip through a range of motion.  My assist flexed the hip to 90 degrees and internally rotated to 85 degrees before it was almost dislocated.  It was stable in full extension and maximal external rotation as well- with no impingement.  The offset was slightly changed by about +4 mm, and the length matched our goal length (+20 mm).  At this point, we removed the broach and placed the final implant. (3 Lateral Polar pressfit stem).  Once ready, we did a final trial and our stability, offset, and length matched our trial above.  We went ahead and impacted the 40 mm X +4 mm ball on the trunion and reduced the hip.  At this point, I repaired the posterior capsule with 1-0 Vicryl.  I irrigated copiously with pulse lavage.  I then closed the gluteus fascia with #1 Vicryl in an interrupted figure of 8 fashion.  I closed the small opening in the IT band with #1 PDS in an interrupted horizontal mattress fashion.  The subQ was closed with 2.0 vicryl and the skin was closed with 3-0 monocryl in a subcuticular fashion.  Steri-strips and Sterile bandages were applied and the patient was returned to the PACU in excellent condition.    Plan:  WBAT  F/U in 2 weeks  Leave dressing for 10 days  DVT Prophylaxis:  ASA 81 mg PO BID for 40 days.        Hema Ledezma

## 2024-06-25 NOTE — ANESTHESIA PROCEDURE NOTES
"Intrathecal injection Procedure Note    Pre-Procedure   Staff -        Anesthesiologist:  Reji Carreon MD       Performed By: anesthesiologist       Location: OR       Procedure Start/Stop Times: 6/25/2024 9:30 AM and 6/25/2024 9:33 AM       Pre-Anesthestic Checklist: patient identified, risks and benefits discussed, informed consent, monitors and equipment checked and pre-op evaluation  Timeout:       Correct Patient: Yes        Correct Procedure: Yes        Correct Site: Yes        Correct Position: Yes   Procedure Documentation  Procedure: intrathecal injection       Diagnosis: hip replacement       Patient Position: sitting       Patient Prep/Sterile Barriers: sterile gloves, mask, patient draped       Skin prep: Chloraprep       Insertion Site: L3-4. (midline approach).       Needle Gauge: 24.        Needle Length (Inches): 4        Spinal Needle Type: Pencan       Introducer used       Introducer: 20 G       # of attempts: 1 and  # of redirects:  0    Assessment/Narrative         Paresthesias: No.       CSF fluid: clear.    Medication(s) Administered   0.5% Bupivacaine PF (Intrathecal) - Intrathecal   2.6 mL - 6/25/2024 9:30:00 AM  Medication Administration Time: 6/25/2024 9:30 AM      FOR [facility] ([facility] ONLY:   Pain Team Contact information: please page the Pain Team Via [employer]. Search \"Pain\". During daytime hours, please page the attending first. At night please page the resident first.      "

## 2024-06-26 ENCOUNTER — APPOINTMENT (OUTPATIENT)
Dept: OCCUPATIONAL THERAPY | Facility: CLINIC | Age: 74
End: 2024-06-26
Attending: ORTHOPAEDIC SURGERY
Payer: MEDICARE

## 2024-06-26 ENCOUNTER — APPOINTMENT (OUTPATIENT)
Dept: PHYSICAL THERAPY | Facility: CLINIC | Age: 74
End: 2024-06-26
Attending: ORTHOPAEDIC SURGERY
Payer: MEDICARE

## 2024-06-26 VITALS
RESPIRATION RATE: 18 BRPM | SYSTOLIC BLOOD PRESSURE: 118 MMHG | DIASTOLIC BLOOD PRESSURE: 64 MMHG | HEART RATE: 75 BPM | HEIGHT: 62 IN | OXYGEN SATURATION: 98 % | WEIGHT: 116 LBS | BODY MASS INDEX: 21.35 KG/M2 | TEMPERATURE: 98.6 F

## 2024-06-26 PROBLEM — R09.81 NASAL CONGESTION: Status: ACTIVE | Noted: 2024-02-26

## 2024-06-26 PROBLEM — E78.5 HYPERLIPIDEMIA: Status: ACTIVE | Noted: 2022-12-13

## 2024-06-26 PROBLEM — I10 ESSENTIAL HYPERTENSION: Status: ACTIVE | Noted: 2024-06-26

## 2024-06-26 PROBLEM — D32.0: Status: ACTIVE | Noted: 2024-06-26

## 2024-06-26 LAB — HGB BLD-MCNC: 10.9 G/DL (ref 11.7–15.7)

## 2024-06-26 PROCEDURE — 97116 GAIT TRAINING THERAPY: CPT | Mod: GP

## 2024-06-26 PROCEDURE — 97166 OT EVAL MOD COMPLEX 45 MIN: CPT | Mod: GO

## 2024-06-26 PROCEDURE — 97110 THERAPEUTIC EXERCISES: CPT | Mod: GP

## 2024-06-26 PROCEDURE — 97535 SELF CARE MNGMENT TRAINING: CPT | Mod: GO

## 2024-06-26 PROCEDURE — 85018 HEMOGLOBIN: CPT | Performed by: ORTHOPAEDIC SURGERY

## 2024-06-26 PROCEDURE — 99232 SBSQ HOSP IP/OBS MODERATE 35: CPT | Performed by: FAMILY MEDICINE

## 2024-06-26 PROCEDURE — 250N000013 HC RX MED GY IP 250 OP 250 PS 637: Performed by: HOSPITALIST

## 2024-06-26 PROCEDURE — 36415 COLL VENOUS BLD VENIPUNCTURE: CPT | Performed by: ORTHOPAEDIC SURGERY

## 2024-06-26 PROCEDURE — 250N000013 HC RX MED GY IP 250 OP 250 PS 637: Performed by: ORTHOPAEDIC SURGERY

## 2024-06-26 PROCEDURE — 250N000011 HC RX IP 250 OP 636: Performed by: ORTHOPAEDIC SURGERY

## 2024-06-26 RX ORDER — ACETAMINOPHEN 325 MG/1
975 TABLET ORAL EVERY 8 HOURS
Status: SHIPPED
Start: 2024-06-26

## 2024-06-26 RX ORDER — METHOCARBAMOL 500 MG/1
500 TABLET, FILM COATED ORAL 3 TIMES DAILY PRN
Qty: 20 TABLET | Refills: 0 | Status: SHIPPED | OUTPATIENT
Start: 2024-06-26

## 2024-06-26 RX ORDER — OXYCODONE HYDROCHLORIDE 5 MG/1
5-10 TABLET ORAL EVERY 4 HOURS PRN
Qty: 25 TABLET | Refills: 0 | Status: SHIPPED | OUTPATIENT
Start: 2024-06-26

## 2024-06-26 RX ORDER — ASPIRIN 81 MG/1
81 TABLET ORAL 2 TIMES DAILY
Qty: 80 TABLET | Refills: 0 | Status: SHIPPED | OUTPATIENT
Start: 2024-06-26 | End: 2024-08-05

## 2024-06-26 RX ORDER — AMOXICILLIN 250 MG
1 CAPSULE ORAL 2 TIMES DAILY
Qty: 30 TABLET | Refills: 0 | Status: SHIPPED | OUTPATIENT
Start: 2024-06-26

## 2024-06-26 RX ORDER — HYDROXYZINE HYDROCHLORIDE 10 MG/1
10 TABLET, FILM COATED ORAL EVERY 6 HOURS PRN
Qty: 30 TABLET | Refills: 0 | Status: SHIPPED | OUTPATIENT
Start: 2024-06-26

## 2024-06-26 RX ORDER — IBUPROFEN 800 MG/1
800 TABLET, FILM COATED ORAL EVERY 8 HOURS PRN
Qty: 40 TABLET | Refills: 0 | Status: SHIPPED | OUTPATIENT
Start: 2024-06-26

## 2024-06-26 RX ADMIN — SENNOSIDES AND DOCUSATE SODIUM 1 TABLET: 50; 8.6 TABLET ORAL at 09:20

## 2024-06-26 RX ADMIN — POLYETHYLENE GLYCOL 3350 17 G: 17 POWDER, FOR SOLUTION ORAL at 09:23

## 2024-06-26 RX ADMIN — HYDROCHLOROTHIAZIDE 25 MG: 25 TABLET ORAL at 09:21

## 2024-06-26 RX ADMIN — ACETAMINOPHEN 975 MG: 325 TABLET ORAL at 09:20

## 2024-06-26 RX ADMIN — Medication 200 MG: at 09:25

## 2024-06-26 RX ADMIN — ASPIRIN 81 MG: 81 TABLET, COATED ORAL at 09:20

## 2024-06-26 RX ADMIN — CEFAZOLIN 1 G: 1 INJECTION, POWDER, FOR SOLUTION INTRAMUSCULAR; INTRAVENOUS at 00:43

## 2024-06-26 RX ADMIN — AMLODIPINE BESYLATE 2.5 MG: 2.5 TABLET ORAL at 09:21

## 2024-06-26 RX ADMIN — OXYCODONE HYDROCHLORIDE 10 MG: 5 TABLET ORAL at 11:27

## 2024-06-26 RX ADMIN — KETOROLAC TROMETHAMINE 15 MG: 30 INJECTION, SOLUTION INTRAMUSCULAR at 04:58

## 2024-06-26 RX ADMIN — OXYCODONE HYDROCHLORIDE 10 MG: 5 TABLET ORAL at 06:18

## 2024-06-26 ASSESSMENT — ACTIVITIES OF DAILY LIVING (ADL)
ADLS_ACUITY_SCORE: 27
ADLS_ACUITY_SCORE: 29
ADLS_ACUITY_SCORE: 29
ADLS_ACUITY_SCORE: 27
ADLS_ACUITY_SCORE: 27
ADLS_ACUITY_SCORE: 29
ADLS_ACUITY_SCORE: 27

## 2024-06-26 NOTE — PROGRESS NOTES
"[facility] Orthopaedics Progress Note      Post-operative Day: 1 Day Post-Op    Procedure(s):  LEFT TOTAL HIP ARTHROPLASTY      Subjective: Patient seen up in bedside chair resting.  No acute events overnight.  Reports pain is tolerable on p.o. analgesics.  Tolerating a regular diet with no nausea or vomiting.  Voiding without difficulty.  Feeling eager to discharge home    Pain: moderate  Chest pain, SOB:  No      Objective:  Blood pressure 118/64, pulse 75, temperature 98.6  F (37  C), temperature source Oral, resp. rate 18, height 1.581 m (5' 2.25\"), weight 52.6 kg (116 lb), SpO2 98%.    Patient Vitals for the past 24 hrs:   BP Temp Temp src Pulse Resp SpO2   06/26/24 0913 118/64 98.6  F (37  C) Oral 75 18 --   06/26/24 0508 (!) 162/80 97.7  F (36.5  C) Oral 71 18 98 %   06/25/24 2351 115/62 98  F (36.7  C) Oral 67 16 97 %   06/25/24 2130 111/58 98  F (36.7  C) Oral 70 15 96 %   06/25/24 1730 113/65 97.6  F (36.4  C) Oral 66 16 97 %   06/25/24 1630 127/59 97.8  F (36.6  C) Oral 84 16 96 %   06/25/24 1530 111/55 98.8  F (37.1  C) Oral 55 17 96 %   06/25/24 1500 120/61 97.5  F (36.4  C) Oral 59 18 97 %   06/25/24 1430 117/86 98.3  F (36.8  C) Oral 75 18 94 %   06/25/24 1400 109/55 -- -- 64 -- 95 %   06/25/24 1300 101/59 -- -- 69 -- 97 %   06/25/24 1230 115/62 -- -- 78 16 100 %   06/25/24 1200 106/61 -- -- 63 16 100 %   06/25/24 1141 114/59 -- -- -- 16 97 %   06/25/24 1130 105/58 -- -- 73 11 100 %   06/25/24 1120 107/55 97.9  F (36.6  C) Temporal 74 14 100 %       Wt Readings from Last 4 Encounters:   06/25/24 52.6 kg (116 lb)   02/10/14 58.1 kg (128 lb)       General: Alert and orientated, NAD  Respiratory: Non-labored breathing on RA  LLE motor function, sensation, and circulation intact   Yes, Dorsiflexion/plantarflexion intact and equal bilaterally. Moves all other extremities with ease and purpose   Wound status: incisions are clean dry and intact. Yes  Calf tenderness: Bilateral  No    Pertinent Labs   Lab " Results: personally reviewed.     Recent Labs   Lab Test 06/26/24  0657   HGB 10.9*       Plan:   Anticoagulation protocol: Aspirin 81 mg BID  x 40  days  Pain medications:  scopainmedication: oxycodone, tylenol, and Robaxin  Weight bearing status:  WBAT, posterior hip precautions   Disposition:  Home today pending therapies and clearance from hospitalist  Continue cares and rehabilitation     Report completed by:  AIYANA Adhikari CNP  Date: 6/26/2024  Time: 10:22 AM

## 2024-06-26 NOTE — PLAN OF CARE
Problem: Hip Arthroplasty  Goal: Optimal Coping    Patient vital signs are at baseline: Yes  Patient able to ambulate as they were prior to admission or with assist devices provided by therapies during their stay:  Yes  Patient MUST void prior to discharge:  Yes  Patient able to tolerate oral intake:  Yes  Pain has adequate pain control using Oral analgesics:  Yes  Does patient have an identified :  Yes  Has goal D/C date and time been discussed with patient:  Yes    Patient a/o x4, VSS on RA. IV fluids SL. Tolerated IV ABX and oral medications. Assist of x1 with a walker and gait belt.  Patient able to urinate adequately . Dressing has scant drainage, marked and CMS intact. Pain was controlled with PRN PO Oxycodone, scheduled medications, ice therapy, and rest.   Denied chest pain, SOB, lightheadedness, dizziness, or N/V.  Bed alarm on for safety precautions. Utilizes call light appropriately.  Pt. Declined to transition into the recliner this AM for therapy.

## 2024-06-26 NOTE — PROGRESS NOTES
06/26/24 0735   Appointment Info   Signing Clinician's Name / Credentials (OT) Mague Sanchez, OTR/L   Rehab Comments (OT) OT markieal   Quick Adds   Quick Adds Certification   Living Environment   People in Home grandchild(freddie)   Current Living Arrangements house   Living Environment Comments One level living, commode over toilet, WIS   Self-Care   Usual Activity Tolerance good   Current Activity Tolerance moderate   Fall history within last six months no   Activity/Exercise/Self-Care Comment Pt IND w/ ADLs and IADLs at baseline   General Information   Onset of Illness/Injury or Date of Surgery 06/25/24   Referring Physician Hema Ledezma   Patient/Family Therapy Goal Statement (OT) To go home   Additional Occupational Profile Info/Pertinent History of Current Problem s/p TIAN   Existing Precautions/Restrictions no hip IR;no hip ADD past midline;weight bearing   Cognitive Status Examination   Orientation Status orientation to person, place and time   Visual Perception   Visual Impairment/Limitations WFL   Sensory   Sensory Quick Adds sensation intact   Pain Assessment   Patient Currently in Pain No   Posture   Posture not impaired   Range of Motion Comprehensive   General Range of Motion no range of motion deficits identified   Strength Comprehensive (MMT)   General Manual Muscle Testing (MMT) Assessment no strength deficits identified   Muscle Tone Assessment   Muscle Tone Quick Adds No deficits were identified   Coordination   Upper Extremity Coordination No deficits were identified   Bed Mobility   Bed Mobility supine-sit;sit-supine   Comment (Bed Mobility) SBA-CGA   Transfers   Transfers bed-chair transfer;sit-stand transfer;toilet transfer;shower transfer   Transfer Comments SBA-CGA   Transfer Skill: Bed to Chair/Chair to Bed   Transfer Comments CGA   Sit-Stand Transfer   Sit/Stand Transfer Comments CGA   Shower Transfer   Shower Transfer Comments CGA   Toilet Transfer   Toilet Transfer Comments CGA   Balance    Balance Comments decreased   Activities of Daily Living   BADL Assessment/Intervention lower body dressing;toileting;bathing   Bathing Assessment/Intervention   [place] Level (Bathing) lower body;minimum assist (75% patient effort)   Lower Body Dressing Assessment/Training   [place] Level (Lower Body Dressing) lower body dressing skills;maximum assist (25% patient effort)   Toileting   [place] Level (Toileting) adjust/manage clothing;supervision   Clinical Impression   Criteria for Skilled Therapeutic Interventions Met (OT) Yes, treatment indicated   OT Diagnosis decreased ADLs   Influenced by the following impairments TIAN   OT Problem List-Impairments impacting ADL activity tolerance impaired;mobility;pain;post-surgical precautions;flexibility;balance   Assessment of Occupational Performance 3-5 Performance Deficits   Identified Performance Deficits LE dressing/bathing, bed mobility, all transfers, toileting   Planned Therapy Interventions (OT) ADL retraining;bed mobility training;transfer training   Clinical Decision Making Complexity (OT) detailed assessment/moderate complexity   Risk & Benefits of therapy have been explained evaluation/treatment results reviewed;patient   OT Total Evaluation Time   OT Eval, Moderate Complexity Minutes (19849) 10   Therapy Certification   Medical Diagnosis TIAN   Start of Care Date 06/26/24   Certification date from 06/26/24   Certification date to 07/26/24   OT Goals   Therapy Frequency (OT) One time eval and treatment   OT Predicted Duration/Target Date for Goal Attainment 06/26/24   OT Goals Lower Body Dressing;Bed Mobility;Toilet Transfer/Toileting   OT: Lower Body Dressing Modified independent;within precautions;Goal Met   OT: Bed Mobility Modified independent;supine to/from sitting;rolling;Goal Met;within precautions   OT: Toilet Transfer/Toileting Modified independent;toilet transfer;cleaning and garment management;within precautions;Goal Met    Interventions   Interventions Quick Adds Self-Care/Home Management   Self-Care/Home Management   Self-Care/Home Mgmt/ADL, Compensatory, Meal Prep Minutes (28070) 35   Symptoms Noted During/After Treatment (Meal Preparation/Planning Training) none   Treatment Detail/Skilled Intervention Alondra present during OT session. Pt and monegther edu on hip px - demonstrated ability to complete ADLs w/in px. Pt edu on compensatory strategies for LE dressing using reacher and sock aid - completed Mod I w/ AE. STS w/ SBA and FWW. Pt amb. 15 ft to BR w/ FWW Mod I. Edu on toilet/walkin shower transfer w/ grab bars - completed each Mod I. Pt instructed on bed mobility techniques - completed Mod I. Pt edu on sleeping position and car transfers - pt verbalized understanding.   OT Discharge Planning   OT Plan DC OT   OT Discharge Recommendation (DC Rec) other (see comments)   OT Rationale for DC Rec Pt is familiar with hip precautions from previuos surgeries and good support from alondra   OT Brief overview of current status Mod I w/bed mob, transfers, LE dressing   Total Session Time   Timed Code Treatment Minutes 35   Total Session Time (sum of timed and untimed services) 45    [facility]  OUTPATIENT OCCUPATIONAL THERAPY  EVALUATION  PLAN OF TREATMENT FOR OUTPATIENT REHABILITATION  (COMPLETE FOR INITIAL CLAIMS ONLY)  Patient's Last Name, First Name, M.I.  YOB: 1950  Maile Nazario                          Provider's Name  [facility] Medical Record No.  [id number]                             Onset Date:  06/25/24   Start of Care Date:  06/26/24   Type:     ___PT   _X_OT   ___SLP Medical Diagnosis:  TIAN                    OT Diagnosis:  decreased ADLs Visits from SOC:  1     See note for plan of treatment, functional goals and certification details    I CERTIFY THE NEED FOR THESE SERVICES FURNISHED UNDER        THIS PLAN OF TREATMENT  AND WHILE UNDER MY CARE     (Physician co-signature of this document indicates review and certification of the therapy plan).                            Occupational Therapy Discharge Summary    Reason for therapy discharge:    All goals and outcomes met, no further needs identified.    Progress towards therapy goal(s). See goals on Care Plan in [facility] electronic health record for goal details.  Goals met    Therapy recommendation(s):    No further therapy is recommended.

## 2024-06-26 NOTE — PLAN OF CARE
Problem: Hip Arthroplasty  Goal: Optimal Functional Ability  Outcome: Progressing  Intervention: Promote Optimal Functional Status  Recent Flowsheet Documentation  Taken 6/25/2024 1830 by Lamar Stovall RN  Activity Management: up in chair  Taken 6/25/2024 1437 by Lamar Stovall RN  Activity Management: activity adjusted per tolerance     Problem: Hip Arthroplasty  Goal: Intact Neurovascular Status  Outcome: Progressing     Problem: Hip Arthroplasty  Goal: Acceptable Pain Control  Intervention: Prevent or Manage Pain  Recent Flowsheet Documentation  Taken 6/25/2024 1437 by Lamar Stovall RN  Pain Management Interventions: medication (see MAR)     Patient vital signs are at baseline: Yes  Patient able to ambulate as they were prior to admission or with assist devices provided by therapies during their stay:  Yes up with A1 walker and gait belt  Patient MUST void prior to discharge:  Yes  Patient able to tolerate oral intake:  Yes   Pain has adequate pain control using Oral analgesics:  Yes pain minimal 4/10 managing with scheduled meds ice and repositioning.  Does patient have an identified :  Yes  Has goal D/C date and time been discussed with patient:  Yes pending clinical progression     Lamar Stovall RN

## 2024-06-26 NOTE — DISCHARGE SUMMARY
[facility] Orthopedics Discharge Summary                                  [facility]     TYRON CHOI [id number]   Age: 74 year old  PCP: Ana Roe, [phone] 1950     Date of Admission:  6/25/2024  Date of Discharge::  6/26/2024  Discharge Provider:  AIYANA Adhikari CNP    Code status:  Full Code    Admission Information:  Admission Diagnosis:  Osteoarthritis of left hip [M16.12]    Post-Operative Day: 1 Day Post-Op     Reason for admission:  The patient was admitted for the following:Procedure(s) (LRB):  LEFT TOTAL HIP ARTHROPLASTY (Left)    Principal Problem:    Primary osteoarthritis of left hip  Active Problems:    Essential hypertension    Cerebellopontine angle meningioma (H)    Hyperlipidemia      Allergies:  Gabapentin, Lisinopril, Prednisone, and Propylene glycol    Following the procedure noted above the patient was transferred to the post-op floor and started on:    Therapy:  physical therapy and occupational therapy  Anticoagulation Plan: Aspirin 81 mg BID  for 40 days  Pain Management: scopainmedication: oxycodone, tylenol, and Robaxin  Weight bearing status: Weight bearing as tolerated, posterior hip precautions     The patient was followed by Orthopedics during the inpatient treatment course:  Complications:  None  Additional consultations:  Hospitalist      Pertinent Labs   Lab Results: personally reviewed.     Recent Labs   Lab Test 06/26/24  0657   HGB 10.9*          Discharge Information:  Condition at discharge: Stable  Discharge destination:  Discharged to home     Medications at discharge:  Current Discharge Medication List        START taking these medications    Details   acetaminophen (TYLENOL) 325 MG tablet Take 3 tablets (975 mg) by mouth every 8 hours    Associated Diagnoses: Primary osteoarthritis of left hip      aspirin 81 MG EC tablet Take 1 tablet (81 mg) by mouth 2 times daily for 40 days  Qty: 80 tablet, Refills: 0    Associated Diagnoses:  Primary osteoarthritis of left hip      hydrOXYzine HCl (ATARAX) 10 MG tablet Take 1 tablet (10 mg) by mouth every 6 hours as needed for other (adjuvant pain)  Qty: 30 tablet, Refills: 0    Associated Diagnoses: Primary osteoarthritis of left hip      ibuprofen (ADVIL/MOTRIN) 800 MG tablet Take 1 tablet (800 mg) by mouth every 8 hours as needed for moderate pain or inflammatory pain  Qty: 40 tablet, Refills: 0    Associated Diagnoses: Primary osteoarthritis of left hip      methocarbamol (ROBAXIN) 500 MG tablet Take 1 tablet (500 mg) by mouth 3 times daily as needed for muscle spasms  Qty: 20 tablet, Refills: 0    Associated Diagnoses: Primary osteoarthritis of left hip      oxyCODONE (ROXICODONE) 5 MG tablet Take 1-2 tablets (5-10 mg) by mouth every 4 hours as needed for moderate pain  Qty: 25 tablet, Refills: 0    Associated Diagnoses: Primary osteoarthritis of left hip      senna-docusate (SENOKOT-S/PERICOLACE) 8.6-50 MG tablet Take 1 tablet by mouth 2 times daily  Qty: 30 tablet, Refills: 0    Associated Diagnoses: Primary osteoarthritis of left hip           CONTINUE these medications which have NOT CHANGED    Details   amLODIPine (NORVASC) 2.5 MG tablet Take 2.5 mg by mouth daily      amoxicillin (AMOXIL) 500 MG tablet Take 2,000 mg by mouth once as needed (Prior to dental visit)      calcium carbonate-vitamin D (CALTRATE) 600-10 MG-MCG per tablet Take 1 tablet by mouth 2 times daily      cetirizine (ZYRTEC) 10 MG tablet Take 10 mg by mouth daily as needed      fluticasone (FLONASE) 50 MCG/ACT nasal spray Spray 1 spray into both nostrils daily as needed      hydrochlorothiazide (HYDRODIURIL) 25 MG tablet Take 25 mg by mouth daily      Magnesium Citrate 125 MG CAPS Take 125 mg by mouth daily      multivitamin, therapeutic (THERA-VIT) TABS tablet Take 1 tablet by mouth daily      vitamin D3 (CHOLECALCIFEROL) 50 mcg (2000 units) tablet Take 1 tablet by mouth daily           STOP taking these medications        meloxicam (MOBIC) 7.5 MG tablet Comments:   Reason for Stopping:         traMADol (ULTRAM) 50 MG tablet Comments:   Reason for Stopping:                          Follow-Up Care:  Patient should be seen in the office in 14 days by the Orthopedic Surgeon/Physician Assistant.  Call [phone] for appointment or questions.    It was my pleasure to take care of the above patient.  AIYANA Adhikari CNP

## 2024-06-26 NOTE — PLAN OF CARE
Goal Outcome Evaluation:  Patient is alert and oriented, pain has been well managed by scheduled and PRN medication, CMS intact, dressing on left hip is clean, dry and intact. Cleared for discharge.        Patient is alert and oriented,   Patient vital signs are at baseline: Yes  Patient able to ambulate as they were prior to admission or with assist devices provided by therapies during their stay:  Yes  Patient MUST void prior to discharge:  Yes  Patient able to tolerate oral intake:  Yes  Pain has adequate pain control using Oral analgesics:  Yes  Does patient have an identified :  Yes  Has goal D/C date and time been discussed with patient:  Yes

## 2024-06-26 NOTE — PROGRESS NOTES
[facility] MEDICINE  PROGRESS NOTE     Code Status: Full Code  Procedure(s):  LEFT TOTAL HIP ARTHROPLASTY  1 Day Post-Op  Identification/Summary:   Maile Nazario is a 74 year old female with a PMH of cerebral pontine angle meningioma, chronic rhinitis, hyperlipidemia, hypertension, lumbar spinal stenosis and osteopenia.  6/25/2024 admitted for left total hip arthroplasty.  Medically doing well.     Assessment and Plan:    Status post left total hip arthroplasty  Postoperative management per orthopedics.    Acute blood loss anemia  Preoperative hemoglobin 13.7.  Down to 10.9.  No indication for transfusion at this time.  Follow per protocols.  Increase iron in diet.    Essential hypertension  Continue home hydrochlorothiazide and Norvasc.    Hyperlipidemia  Per report patient has declined statins though does have an elevated calcium score.    Cerebral pontine angle meningioma  Follows at [facility].  Has anticipated surgery in October as meningioma appears to be increasing in size based on April 2024 imaging studies.    Anticoagulation   Aspirin 81 mg twice daily per orthopedics.  Routine postoperative risk.    COVID-19 PCR not tested    Fluids: Saline lock  Pain meds: Per orthopedics  Therapy: Per orthopedics  Sheehan:Not present  Lines: None       Current Diet  Orders Placed This Encounter      Advance Diet as Tolerated: Regular Diet Adult    Supplements  None    Barriers to Discharge: Medically stable for discharge by orthopedics    Disposition: Discharge med rec reviewed and our area of responsibility was addressed.  Medically Ready for Discharge: Anticipated Today       Clinically Significant Risk Factors Present on Admission                     # Anemia: based on hgb <11                      Interval History/Subjective:  Doing well.  Pain under good control.  No chest pain.  No shortness of breath.  No nausea or vomiting.  No lightheadedness or dizziness.  Granddaughter is  present and supportive.  Questions answered to verbalized satisfaction.      Last 24H PRN:     oxyCODONE (ROXICODONE) tablet 5 mg, 5 mg at 06/25/24 2202 **OR** oxyCODONE (ROXICODONE) tablet 10 mg, 10 mg at 06/26/24 0618    Physical Exam/Objective:  Temp:  [97.5  F (36.4  C)-98.8  F (37.1  C)] 97.7  F (36.5  C)  Pulse:  [55-84] 71  Resp:  [11-18] 18  BP: (101-162)/(55-86) 162/80  SpO2:  [94 %-100 %] 98 %  Wt Readings from Last 4 Encounters:   06/25/24 52.6 kg (116 lb)   02/10/14 58.1 kg (128 lb)     Body mass index is 21.05 kg/m .    Constitutional: awake, alert, cooperative, no apparent distress, and appears stated age  ENT: Normocephalic, without obvious abnormality, atraumatic, external ears without lesions, oral pharynx with moist mucous membranes, tonsils without erythema or exudates, gums normal and good dentition.  Respiratory: No increased work of breathing, good air exchange, clear to auscultation bilaterally, no crackles or wheezing  Cardiovascular: Normal apical impulse, regular rate and rhythm, normal S1 and S2, no S3 or S4, and no murmur noted  GI: No scars, normal bowel sounds, soft, non-distended, non-tender, no masses palpated, no hepatosplenomegally  Skin: normal skin color, texture, turgor, no redness, warmth, or swelling, and no rashes  Musculoskeletal: Good muscular tone and strength in all 4 extremities.  Good pulses at the dorsalis pedis and posterior tibialis bilaterally. no lower extremity pitting edema present  Neurologic: Cranial nerves II-XII are grossly intact. Sensory:  Sensory intact  Neuropsychiatric: General: normal, calm, and normal eye contact Level of consciousness: alert / normal Affect: normal Orientation: oriented to self, place, time and situation Memory and insight: normal, memory for past and recent events intact, and thought process normal      Medications:   Personally Reviewed.  Medications   Current Facility-Administered Medications   Medication Dose Route Frequency  Provider Last Rate Last Admin    lactated ringers infusion   Intravenous Continuous Hema Ledezma  mL/hr at 06/25/24 2348 New Bag at 06/25/24 2348     Current Facility-Administered Medications   Medication Dose Route Frequency Provider Last Rate Last Admin    acetaminophen (TYLENOL) tablet 975 mg  975 mg Oral Q8H Hema Ledezma MD   975 mg at 06/25/24 2300    amLODIPine (NORVASC) tablet 2.5 mg  2.5 mg Oral Daily Azael Wasserman MD        aspirin EC tablet 81 mg  81 mg Oral BID Hema Ledezma MD   81 mg at 06/25/24 2022    hydrochlorothiazide (HYDRODIURIL) tablet 25 mg  25 mg Oral Daily Azael Wasserman MD        magnesium oxide (MAG-OX) half-tab 200 mg  200 mg Oral Daily Hema Ledezma MD   200 mg at 06/25/24 1644    polyethylene glycol (MIRALAX) Packet 17 g  17 g Oral Daily Hema Ledezma MD        senna-docusate (SENOKOT-S/PERICOLACE) 8.6-50 MG per tablet 1 tablet  1 tablet Oral BID Hema Ledezma MD   1 tablet at 06/25/24 2022    sodium chloride (PF) 0.9% PF flush 3 mL  3 mL Intracatheter Q8H Hema Ledezma MD   3 mL at 06/26/24 0458       Data reviewed today: I personally reviewed all new medications, labs, imaging/diagnostics reports over the past 24 hours. Pertinent findings include:    Imaging:   Recent Results (from the past 24 hour(s))   XR Pelvis Port 1/2 Views    Narrative    EXAM: XR PELVIS PORT 1/2 VIEWS  LOCATION: [facility]  DATE: 6/25/2024    INDICATION: Status post Hip surgery  COMPARISON: None.      Impression    IMPRESSION: Postoperative changes bilateral total hip arthroplasty. Components appear well seated. Postprocedural air surrounding the left hip joint. The visualized pelvis is negative for fracture.       Labs:  XR Pelvis Port 1/2 Views   Final Result   IMPRESSION: Postoperative changes bilateral total hip arthroplasty. Components appear well seated. Postprocedural air surrounding the left hip joint. The visualized pelvis is  negative for fracture.        Recent Results (from the past 24 hour(s))   Creatinine    Collection Time: 06/25/24  2:34 PM   Result Value Ref Range    Creatinine 0.71 0.51 - 0.95 mg/dL    GFR Estimate 89 >60 mL/min/1.73m2   Hemoglobin    Collection Time: 06/26/24  6:57 AM   Result Value Ref Range    Hemoglobin 10.9 (L) 11.7 - 15.7 g/dL       Pending Labs:  Unresulted Labs Ordered in the Past 30 Days of this Admission       No orders found for last 31 day(s).              Maldonado Sarabia MD  [facility] Medicine  [facility]  Phone: #[phone]

## 2024-06-26 NOTE — PLAN OF CARE
DISCHARGE RN NOTE    Patient discharged to home at 12:49 PM via wheel chair. Accompanied by daughter and staff. Discharge instructions reviewed with patient and daughter, opportunity offered to ask questions. Prescriptions sent to patients preferred pharmacy. All belongings sent with patient. PIV removed. Orthopedic stoplight tool reviewed and verbal understanding was given. Daughter to transport.     Roberto Brown RN

## (undated) DEVICE — SOL WATER IRRIG 1000ML BOTTLE 2F7114

## (undated) DEVICE — BONE CLEANING TIP INTERPULSE  0210-010-000

## (undated) DEVICE — SUTURE PDS 1 CTB-1 ZB347

## (undated) DEVICE — POSITIONER ABDUCTION PILLOW FOAM MED FP-ABDUCTM

## (undated) DEVICE — SUTURE VICRYL+ 2-0 27IN CT-1 UND VCP259H

## (undated) DEVICE — GOWN IMPERVIOUS BREATHABLE SMART XLG 89045

## (undated) DEVICE — POSITIONER HEAD DONUT FOAM 9" LF FP-HEAD9

## (undated) DEVICE — SUCTION SLEEVE NEPTUNE 2 165MM 0703-005-165

## (undated) DEVICE — SUCTION MANIFOLD NEPTUNE 2 SYS 4 PORT 0702-020-000

## (undated) DEVICE — SOLUTION IRRIG 2B7127 .9NS 3000ML BAG

## (undated) DEVICE — DRAPE IOBAN INCISE 23X17" 6650EZ

## (undated) DEVICE — SUTURE MONOCRYL 3-0 18 PS2 UND MCP497G

## (undated) DEVICE — DECANTER VIAL 2006S

## (undated) DEVICE — SOL NACL 0.9% IRRIG 1000ML BOTTLE 2F7124

## (undated) DEVICE — ELECTRODE PATIENT RETURN ADULT L10 FT 2 PLATE CORD 0855C

## (undated) DEVICE — GLOVE BIOGEL INDICATOR 7.5 LF 41675

## (undated) DEVICE — ESU ELEC BLADE 6" COATED E1450-6

## (undated) DEVICE — SUCTION IRR SYSTEM W/O TIP INTERPULSE HANDPIECE 0210-100-000

## (undated) DEVICE — GLOVE BIOGEL PI INDICATOR 8.0 LF 41680

## (undated) DEVICE — HOOD SURG T7PLUS PEEL AWAY FACE SHIELD STRL LF 0416-801-100

## (undated) DEVICE — CUSTOM PACK TOTAL HIP SOP5BTHHEA

## (undated) DEVICE — SUTURE VICRYL+ 1 18 CT/CR  VLT VCP753D

## (undated) DEVICE — GLOVE BIOGEL PI SZ 7.0 40870

## (undated) DEVICE — NEEDLE HYPO MAGELLAN SAFETY 22GA 1 1/2IN 8881850215

## (undated) DEVICE — BLADE SAGITTAL WIDE (SO-618) 2108-118

## (undated) DEVICE — HOLDER LIMB VELCRO OR 0814-1533

## (undated) DEVICE — GLOVE BIOGEL PI SZ 8.0 40880

## (undated) RX ORDER — PROPOFOL 10 MG/ML
INJECTION, EMULSION INTRAVENOUS
Status: DISPENSED
Start: 2024-06-25

## (undated) RX ORDER — BUPIVACAINE HYDROCHLORIDE 5 MG/ML
INJECTION, SOLUTION EPIDURAL; INTRACAUDAL
Status: DISPENSED
Start: 2024-06-25

## (undated) RX ORDER — FENTANYL CITRATE 50 UG/ML
INJECTION, SOLUTION INTRAMUSCULAR; INTRAVENOUS
Status: DISPENSED
Start: 2024-06-25

## (undated) RX ORDER — GLYCOPYRROLATE 0.2 MG/ML
INJECTION, SOLUTION INTRAMUSCULAR; INTRAVENOUS
Status: DISPENSED
Start: 2024-06-25

## (undated) RX ORDER — DEXAMETHASONE SODIUM PHOSPHATE 4 MG/ML
INJECTION, SOLUTION INTRA-ARTICULAR; INTRALESIONAL; INTRAMUSCULAR; INTRAVENOUS; SOFT TISSUE
Status: DISPENSED
Start: 2024-06-25

## (undated) RX ORDER — ONDANSETRON 2 MG/ML
INJECTION INTRAMUSCULAR; INTRAVENOUS
Status: DISPENSED
Start: 2024-06-25

## (undated) RX ORDER — FENTANYL CITRATE-0.9 % NACL/PF 10 MCG/ML
PLASTIC BAG, INJECTION (ML) INTRAVENOUS
Status: DISPENSED
Start: 2024-06-25